# Patient Record
Sex: FEMALE | Race: WHITE | NOT HISPANIC OR LATINO | Employment: OTHER | URBAN - METROPOLITAN AREA
[De-identification: names, ages, dates, MRNs, and addresses within clinical notes are randomized per-mention and may not be internally consistent; named-entity substitution may affect disease eponyms.]

---

## 2018-03-08 RX ORDER — ESTROGENS, CONJUGATED 0.62 MG/1
TABLET, FILM COATED ORAL
Qty: 30 TABLET | Refills: 0 | Status: SHIPPED | OUTPATIENT
Start: 2018-03-08 | End: 2019-02-14

## 2019-02-14 ENCOUNTER — OFFICE VISIT (OUTPATIENT)
Dept: NEUROLOGY | Facility: CLINIC | Age: 84
End: 2019-02-14
Payer: MEDICARE

## 2019-02-14 VITALS
HEART RATE: 72 BPM | BODY MASS INDEX: 23.53 KG/M2 | WEIGHT: 127.88 LBS | HEIGHT: 62 IN | DIASTOLIC BLOOD PRESSURE: 83 MMHG | SYSTOLIC BLOOD PRESSURE: 135 MMHG

## 2019-02-14 DIAGNOSIS — G31.84 MILD COGNITIVE IMPAIRMENT: ICD-10-CM

## 2019-02-14 DIAGNOSIS — F07.81 POSTCONCUSSIONAL SYNDROME: ICD-10-CM

## 2019-02-14 DIAGNOSIS — S06.9X1A TRAUMATIC BRAIN INJURY, WITH LOSS OF CONSCIOUSNESS OF 30 MINUTES OR LESS, INITIAL ENCOUNTER: ICD-10-CM

## 2019-02-14 PROBLEM — S06.9XAA TRAUMATIC BRAIN INJURY: Status: ACTIVE | Noted: 2019-02-14

## 2019-02-14 PROCEDURE — 99204 OFFICE O/P NEW MOD 45 MIN: CPT | Mod: S$PBB,,, | Performed by: PSYCHIATRY & NEUROLOGY

## 2019-02-14 PROCEDURE — 99204 PR OFFICE/OUTPT VISIT, NEW, LEVL IV, 45-59 MIN: ICD-10-PCS | Mod: S$PBB,,, | Performed by: PSYCHIATRY & NEUROLOGY

## 2019-02-14 PROCEDURE — 99999 PR PBB SHADOW E&M-EST. PATIENT-LVL III: CPT | Mod: PBBFAC,,, | Performed by: PSYCHIATRY & NEUROLOGY

## 2019-02-14 PROCEDURE — 99213 OFFICE O/P EST LOW 20 MIN: CPT | Mod: PBBFAC | Performed by: PSYCHIATRY & NEUROLOGY

## 2019-02-14 PROCEDURE — 99999 PR PBB SHADOW E&M-EST. PATIENT-LVL III: ICD-10-PCS | Mod: PBBFAC,,, | Performed by: PSYCHIATRY & NEUROLOGY

## 2019-02-14 RX ORDER — QUETIAPINE FUMARATE 25 MG/1
TABLET, FILM COATED ORAL
COMMUNITY
Start: 2019-01-17 | End: 2022-04-30

## 2019-02-14 RX ORDER — ATORVASTATIN CALCIUM 20 MG/1
TABLET, FILM COATED ORAL
COMMUNITY
Start: 2019-02-01

## 2019-02-14 RX ORDER — BUTALBITAL, ACETAMINOPHEN AND CAFFEINE 300; 40; 50 MG/1; MG/1; MG/1
CAPSULE ORAL
COMMUNITY
Start: 2019-01-18 | End: 2019-02-14

## 2019-02-14 RX ORDER — AMLODIPINE BESYLATE 10 MG/1
TABLET ORAL
COMMUNITY
Start: 2019-02-01

## 2019-02-14 RX ORDER — LISINOPRIL 20 MG/1
TABLET ORAL
COMMUNITY
Start: 2019-02-01

## 2019-02-14 NOTE — PROGRESS NOTES
Subjective:       Patient ID: Shira Dang is a 89 y.o. female.    Chief Complaint:  Memory Loss      History of Present Illness  HPI   This is an 89-year-old female was referred for evaluation of cognitive difficulties.  She was accompanied by her daughter.  About 7-8 weeks ago while at [facility] while visiting her  who was being evaluated for sleep apnea, she had a fall when she tripped on a step eating to the walkway.  She fell followed and sustained trauma to the right forehead and may have briefly lost consciousness however the patient did not want to seek medical attention at that time.  Subsequently on the next day or 2 she started getting a little agitated and confused and was then brought to the ED.  Medical records from that evaluation are not available for review.  The daughter reports that she had a CT scan of head done in the ED and she was given Ativan which made a much more confused and disoriented and she was having visual hallucinations.  Because of her behavior she was transferred to the behavioral unit until she stabilized and she was then discharged home.  Since being at home she has gradually recovered and is now back to her usual baseline.  It is noted that she has been on Seroquel 25 mg at bedtime for behavior and sleep.  She has been having some memory difficulties primarily with retaining recent information but otherwise was functional at home able to take care of her day-to-day needs including personal hygiene.  She does not cook or drive, and her daughter to care of her finances, transportation and medications.  She lives with her  who is a patient of mine.  She denies any headaches, visual impairment or hearing loss.       Review of Systems  Review of Systems   Constitutional: Negative.    HENT: Negative.  Negative for hearing loss.    Eyes: Negative.  Negative for visual disturbance.   Respiratory: Negative.  Negative for shortness of breath.     Cardiovascular: Negative.  Negative for chest pain and palpitations.   Gastrointestinal: Negative.    Genitourinary: Negative.    Musculoskeletal: Positive for arthralgias. Negative for back pain, gait problem and neck pain.   Skin: Negative.    Neurological: Negative.  Negative for tremors, seizures, syncope, speech difficulty, weakness, numbness and headaches.   Psychiatric/Behavioral: Positive for decreased concentration. Negative for confusion.       Objective:      Neurologic Exam     Cranial Nerves     CN III, IV, VI   Pupils are equal, round, and reactive to light.  Extraocular motions are normal.       Physical Exam   Constitutional: She appears well-developed and well-nourished.   HENT:   Head: Normocephalic and atraumatic.   Right Ear: Hearing normal.   Left Ear: Hearing normal.   Eyes: EOM are normal. Pupils are equal, round, and reactive to light.   Fundus examination difficulty related to small pupils   Neck: Normal range of motion. Neck supple. Carotid bruit is not present.   Cardiovascular: Normal rate and regular rhythm.   Neurological: She is alert. She has normal reflexes. She displays no atrophy (No pronator drift noted). No cranial nerve deficit (Visual fields at bedside testing essentially normal.  No facial asymmetry noted with facial movements and sensory exam being normal/symmetrical.  Corneals/gag reflexes normal.  Tongue & palate movements normal.  Shoulder shrug was normal.) or sensory deficit. She exhibits normal muscle tone. Coordination (Mild clumsiness of fine motor bilaterally) and gait (Gait is stable.) normal. She displays no Babinski's sign on the right side. She displays no Babinski's sign on the left side.   Mental status examination: Patient is oriented to place and person and grossly to time.  She is able to give adequate history however is amnestic for the time she was admitted to the after the head trauma.  Attention span and concentration was normal.  Mild difficulty with  processing information which she was otherwise appropriate.  Judgment and insight is normal.  Language functions are intact with no evidence of aphasia or dysarthria.  Comprehension is unimpaired.  Affect is appropriate, mood was even.  No thought disorder is noted.   Vitals reviewed.        Assessment:        1. Traumatic brain injury, with loss of consciousness of 30 minutes or less, initial encounter    2. Postconcussional syndrome    3. Mild cognitive impairment            Plan:       Discussed with patient and daughter.  She has minimal cognitive difficulties that may represent mild cognitive impairment on a vascular basis or age-related cognitive decline given her age of 89.  She is otherwise quite functional at home within a structured environment.  Her confusion and agitation following the head trauma is most likely related to the traumatic brain injury and Ativan may have exacerbated her confusion and agitation.  Since discharge from the hospital she has done well.  She was started on Aricept in the hospital but this was discontinued.  Sleep and appetite is good at this time.  Observation for now.  Advised daughter that any sedated medications including do OTC drugs leg Benadryl, should be avoided as it would make the more confused and disoriented.  She will keep in touch with me via [employer].  Will follow up on an as-needed basis.

## 2019-02-14 NOTE — PATIENT INSTRUCTIONS
Discussed with patient and daughter.  She has minimal cognitive difficulties that may represent mild cognitive impairment on a vascular basis or age-related cognitive decline given her age of 89.  She is otherwise quite functional at home within a structured environment.  Her confusion and agitation following the head trauma is most likely related to the traumatic brain injury and Ativan may have exacerbated her confusion and agitation.  Since discharge from the hospital she has done well.  She was started on Aricept in the hospital but this was discontinued.  Sleep and appetite is good at this time.  Observation for now.  Advised daughter that any sedated medications including do OTC drugs leg Benadryl, should be avoided as it would make the more confused and disoriented.  She will keep in touch with me via [employer] [facility] [employer].

## 2021-01-10 ENCOUNTER — IMMUNIZATION (OUTPATIENT)
Dept: INTERNAL MEDICINE | Facility: CLINIC | Age: 86
End: 2021-01-10
Payer: MEDICARE

## 2021-01-10 DIAGNOSIS — Z23 NEED FOR VACCINATION: ICD-10-CM

## 2021-01-10 PROCEDURE — 91300 COVID-19, MRNA, LNP-S, PF, 30 MCG/0.3 ML DOSE VACCINE: CPT | Mod: PBBFAC | Performed by: FAMILY MEDICINE

## 2021-01-31 ENCOUNTER — IMMUNIZATION (OUTPATIENT)
Dept: INTERNAL MEDICINE | Facility: CLINIC | Age: 86
End: 2021-01-31
Payer: MEDICARE

## 2021-01-31 DIAGNOSIS — Z23 NEED FOR VACCINATION: Primary | ICD-10-CM

## 2021-01-31 PROCEDURE — 91300 COVID-19, MRNA, LNP-S, PF, 30 MCG/0.3 ML DOSE VACCINE: CPT | Mod: PBBFAC | Performed by: FAMILY MEDICINE

## 2021-01-31 PROCEDURE — 0002A COVID-19, MRNA, LNP-S, PF, 30 MCG/0.3 ML DOSE VACCINE: CPT | Mod: PBBFAC | Performed by: FAMILY MEDICINE
